# Patient Record
Sex: MALE | Race: WHITE | NOT HISPANIC OR LATINO | URBAN - NONMETROPOLITAN AREA
[De-identification: names, ages, dates, MRNs, and addresses within clinical notes are randomized per-mention and may not be internally consistent; named-entity substitution may affect disease eponyms.]

---

## 2017-07-20 PROBLEM — D36.9 TUBULAR ADENOMA: Status: ACTIVE | Noted: 2017-07-20

## 2021-04-12 ENCOUNTER — TRANSCRIBE ORDERS (OUTPATIENT)
Dept: ADMINISTRATIVE | Facility: HOSPITAL | Age: 66
End: 2021-04-12

## 2021-04-12 DIAGNOSIS — H91.20 SUDDEN HEARING LOSS, UNSPECIFIED LATERALITY: Primary | ICD-10-CM

## 2021-04-16 ENCOUNTER — HOSPITAL ENCOUNTER (OUTPATIENT)
Dept: MRI IMAGING | Facility: HOSPITAL | Age: 66
Discharge: HOME | End: 2021-04-16

## 2021-04-16 DIAGNOSIS — H91.20 SUDDEN HEARING LOSS, UNSPECIFIED LATERALITY: ICD-10-CM

## 2021-04-16 LAB — CREAT BLDA-MCNC: 1 MG/DL (ref 0.6–1.3)

## 2021-04-16 PROCEDURE — 70553 MRI BRAIN STEM W/O & W/DYE: CPT | Performed by: RADIOLOGY

## 2021-04-16 PROCEDURE — 0 GADOBENATE DIMEGLUMINE 529 MG/ML SOLUTION

## 2021-04-16 PROCEDURE — 70553 MRI BRAIN STEM W/O & W/DYE: CPT

## 2021-04-16 PROCEDURE — 70543 MRI ORBT/FAC/NCK W/O &W/DYE: CPT | Performed by: RADIOLOGY

## 2021-04-16 PROCEDURE — A9577 INJ MULTIHANCE: HCPCS

## 2021-04-16 PROCEDURE — A9577 INJ MULTIHANCE: HCPCS | Performed by: OTOLARYNGOLOGY

## 2021-04-16 PROCEDURE — 0 GADOBENATE DIMEGLUMINE 529 MG/ML SOLUTION: Performed by: OTOLARYNGOLOGY

## 2021-04-16 PROCEDURE — 82565 ASSAY OF CREATININE: CPT

## 2021-04-16 RX ADMIN — GADOBENATE DIMEGLUMINE 20 ML: 529 INJECTION, SOLUTION INTRAVENOUS at 11:55

## 2021-10-22 ENCOUNTER — IMMUNIZATION (OUTPATIENT)
Dept: VACCINE CLINIC | Facility: HOSPITAL | Age: 66
End: 2021-10-22

## 2021-10-22 PROCEDURE — 91300 HC SARSCOV02 VAC 30MCG/0.3ML IM: CPT | Performed by: INTERNAL MEDICINE

## 2021-10-22 PROCEDURE — 0004A ADM SARSCOV2 30MCG/0.3ML BOOSTER: CPT | Performed by: INTERNAL MEDICINE

## 2024-10-09 ENCOUNTER — OFFICE VISIT (OUTPATIENT)
Dept: FAMILY MEDICINE CLINIC | Facility: CLINIC | Age: 69
End: 2024-10-09
Payer: MEDICARE

## 2024-10-09 ENCOUNTER — TELEPHONE (OUTPATIENT)
Dept: FAMILY MEDICINE CLINIC | Facility: CLINIC | Age: 69
End: 2024-10-09

## 2024-10-09 ENCOUNTER — PATIENT ROUNDING (BHMG ONLY) (OUTPATIENT)
Dept: FAMILY MEDICINE CLINIC | Facility: CLINIC | Age: 69
End: 2024-10-09
Payer: MEDICARE

## 2024-10-09 VITALS
HEART RATE: 85 BPM | WEIGHT: 256.4 LBS | OXYGEN SATURATION: 97 % | TEMPERATURE: 96.8 F | SYSTOLIC BLOOD PRESSURE: 122 MMHG | BODY MASS INDEX: 36.71 KG/M2 | DIASTOLIC BLOOD PRESSURE: 76 MMHG | HEIGHT: 70 IN

## 2024-10-09 DIAGNOSIS — E11.65 TYPE 2 DIABETES MELLITUS WITH HYPERGLYCEMIA, WITHOUT LONG-TERM CURRENT USE OF INSULIN: ICD-10-CM

## 2024-10-09 PROCEDURE — 99204 OFFICE O/P NEW MOD 45 MIN: CPT | Performed by: STUDENT IN AN ORGANIZED HEALTH CARE EDUCATION/TRAINING PROGRAM

## 2024-10-09 PROCEDURE — 1126F AMNT PAIN NOTED NONE PRSNT: CPT | Performed by: STUDENT IN AN ORGANIZED HEALTH CARE EDUCATION/TRAINING PROGRAM

## 2024-10-09 RX ORDER — BLOOD-GLUCOSE METER
1 KIT MISCELLANEOUS AS NEEDED
Qty: 1 EACH | Refills: 0 | Status: SHIPPED | OUTPATIENT
Start: 2024-10-09

## 2024-10-09 RX ORDER — METFORMIN HCL 500 MG
500 TABLET, EXTENDED RELEASE 24 HR ORAL 2 TIMES DAILY
Qty: 60 TABLET | Refills: 2 | Status: SHIPPED | OUTPATIENT
Start: 2024-10-09

## 2024-10-09 RX ORDER — LANCETS 28 GAUGE
EACH MISCELLANEOUS
Qty: 100 EACH | Refills: 12 | Status: SHIPPED | OUTPATIENT
Start: 2024-10-09

## 2024-10-09 NOTE — PROGRESS NOTES
October 9, 2024    Hello, may I speak with Arsalan Garcia?    My name is   MARY JO    I am  with [facility]  [facility]  [address] [place] [address] [phone].    Before we get started may I verify your date of birth? 1955 YES    I am calling to officially welcome you to our practice and ask about your recent visit. Is this a good time to talk? YES    Tell me about your visit with us. What things went well?  THE VISIT WENT GREAT         We're always looking for ways to make our patients' experiences even better. Do you have recommendations on ways we may improve?  NO     Overall were you satisfied with your first visit to our practice?   YES     I appreciate you taking the time to speak with me today. Is there anything else I can do for you?   NO     Thank you, and have a great day.

## 2024-10-09 NOTE — PROGRESS NOTES
Chief Complaint  Hyperglycemia    HPI:   Hyperglycemia       Arsalan Garcia is a 69 y.o. male who presents today to [facility] for Hyperglycemia. Patient presents for new onset type II diabetes. He reports that his A1c was checked at orthopedic surgery when he was preparing to have his right hip replaced. His A1c at that time was 10.1. He reports that he has polydipsia, and polyuria. He denies weight loss.     Previous History:   Past Medical History:   Diagnosis Date    Sleep apnea       Past Surgical History:   Procedure Laterality Date    TOTAL HIP ARTHROPLASTY Left           Social History     Socioeconomic History    Marital status: Unknown   Tobacco Use    Smoking status: Former     Current packs/day: 0.00     Types: Cigarettes     Quit date:      Years since quittin.8    Smokeless tobacco: Never   Vaping Use    Vaping status: Never Used   Substance and Sexual Activity    Alcohol use: Never    Drug use: Defer    Sexual activity: Never      Health Maintenance Due   Topic Date Due    BMI FOLLOWUP  Never done    TDAP/TD VACCINES (1 - Tdap) Never done    ZOSTER VACCINE (1 of 2) Never done    HEPATITIS C SCREENING  Never done    ANNUAL WELLNESS VISIT  Never done    COVID-19 Vaccine (4 - 2023-24 season) 2024        Current Medications:  Current Outpatient Medications   Medication Sig Dispense Refill    glucose blood test strip Use as instructed 100 each 12    glucose monitor monitoring kit Use 1 each As Needed (Check Glucose TID). 1 each 0    Lancets (freestyle) lancets Check Glucose  each 12    metFORMIN ER (GLUCOPHAGE-XR) 500 MG 24 hr tablet Take 1 tablet by mouth 2 (Two) Times a Day. 60 tablet 2    Semaglutide,0.25 or 0.5MG/DOS, (OZEMPIC) 2 MG/1.5ML solution pen-injector Inject 0.25 mg under the skin into the appropriate area as directed 1 (One) Time Per Week. 1.5 mL 0     No current facility-administered medications for this visit.       Allergies:  "  Allergies   Allergen Reactions    Shrimp Nausea And Vomiting       Vitals:   /76 (BP Location: Right arm, Patient Position: Sitting, Cuff Size: Adult)   Pulse 85   Temp 96.8 °F (36 °C) (Temporal)   Ht 177.8 cm (70\")   Wt 116 kg (256 lb 6.4 oz)   SpO2 97%   BMI 36.79 kg/m²     Estimated body mass index is 36.79 kg/m² as calculated from the following:    Height as of this encounter: 177.8 cm (70\").    Weight as of this encounter: 116 kg (256 lb 6.4 oz).    Arsalan Garcia  reports that he quit smoking about 49 years ago. His smoking use included cigarettes. He has never used smokeless tobacco.            Physical Exam:   Physical Exam  Constitutional:       Appearance: Normal appearance.   HENT:      Mouth/Throat:      Mouth: Mucous membranes are moist.   Cardiovascular:      Rate and Rhythm: Normal rate and regular rhythm.   Pulmonary:      Effort: Pulmonary effort is normal.      Breath sounds: Normal breath sounds. No wheezing or rhonchi.   Musculoskeletal:         General: Normal range of motion.   Skin:     General: Skin is warm and dry.   Neurological:      Mental Status: He is alert.   Psychiatric:         Mood and Affect: Mood normal.          Lab Results:   No visits with results within 6 Month(s) from this visit.   Latest known visit with results is:   Hospital Outpatient Visit on 04/16/2021   Component Date Value Ref Range Status    Creatinine 04/16/2021 1.00  0.60 - 1.30 mg/dL Final    Serial Number: [id number]:  [id number]       Assessment and Plan  Diagnoses and all orders for this visit:    1. Type 2 diabetes mellitus with hyperglycemia, without long-term current use of insulin  -     metFORMIN ER (GLUCOPHAGE-XR) 500 MG 24 hr tablet; Take 1 tablet by mouth 2 (Two) Times a Day.  Dispense: 60 tablet; Refill: 2  -     Semaglutide,0.25 or 0.5MG/DOS, (OZEMPIC) 2 MG/1.5ML solution pen-injector; Inject 0.25 mg under the skin into the appropriate area as directed 1 (One) Time Per Week.  Dispense: 1.5 " mL; Refill: 0  -     glucose blood test strip; Use as instructed  Dispense: 100 each; Refill: 12  -     Lancets (freestyle) lancets; Check Glucose TID  Dispense: 100 each; Refill: 12  -     glucose monitor monitoring kit; Use 1 each As Needed (Check Glucose TID).  Dispense: 1 each; Refill: 0    Patient has new onset diabetes with A1c of 10.7. Patient currently has hyperglycemic symptoms. Due to his high glucose he will likely need two anti-diabetic drugs. I will start metformin 500mg to increase insulin sensitivity as well as ozempic 0.25mg weekly. Patient denies history of pancreatitis, no history of gallstones, no history of medullary thyroid cancer. Discussed risks and benefits of GLP-1s. I will discuss ace/arb at next visit, as well as statin for ASCVD prevention. Discussed dietary measures.     I spent 32 minutes of face to face time with the patient, explaining the diagnosis and treatment plan.       Class 2 Severe Obesity (BMI >=35 and <=39.9). Obesity-related health conditions include the following: obstructive sleep apnea and diabetes mellitus. Obesity is newly identified. BMI is is above average; BMI management plan is completed. We discussed portion control and increasing exercise.       New Medications:   New Medications Ordered This Visit   Medications    metFORMIN ER (GLUCOPHAGE-XR) 500 MG 24 hr tablet     Sig: Take 1 tablet by mouth 2 (Two) Times a Day.     Dispense:  60 tablet     Refill:  2    Semaglutide,0.25 or 0.5MG/DOS, (OZEMPIC) 2 MG/1.5ML solution pen-injector     Sig: Inject 0.25 mg under the skin into the appropriate area as directed 1 (One) Time Per Week.     Dispense:  1.5 mL     Refill:  0    glucose blood test strip     Sig: Use as instructed     Dispense:  100 each     Refill:  12    Lancets (freestyle) lancets     Sig: Check Glucose TID     Dispense:  100 each     Refill:  12    glucose monitor monitoring kit     Sig: Use 1 each As Needed (Check Glucose TID).     Dispense:  1 each      Refill:  0       Discontinued Medications:   There are no discontinued medications.              Follow Up:   No follow-ups on file.    Patient was given instructions and counseling regarding his condition or for health maintenance advice. Please see specific information pulled into the AVS if appropriate.       This document has been electronically signed by Hari Diaz DO   October 9, 2024 14:04 EDT    Dictated Utilizing Dragon Dictation: Part of this note may be an electronic transcription/translation of spoken language to printed text using the Dragon Dictation System.

## 2024-10-09 NOTE — TELEPHONE ENCOUNTER
Pt's wife called and stated that when going to  the Ozempic injection, the pharmacist told them that he thinks that 0.25 mg is not high enough for pt since he has a high A1C. I explained to her that this is his first time use, and this is the typical dosing for first time use of this medication. She would still like you to clarify if he is on the correct dose or not.

## 2024-11-07 DIAGNOSIS — E11.65 TYPE 2 DIABETES MELLITUS WITH HYPERGLYCEMIA, WITHOUT LONG-TERM CURRENT USE OF INSULIN: ICD-10-CM

## 2024-11-08 RX ORDER — SEMAGLUTIDE 0.68 MG/ML
INJECTION, SOLUTION SUBCUTANEOUS
Qty: 3 ML | Refills: 0 | Status: SHIPPED | OUTPATIENT
Start: 2024-11-08

## 2024-11-18 ENCOUNTER — OFFICE VISIT (OUTPATIENT)
Dept: UROLOGY | Facility: CLINIC | Age: 69
End: 2024-11-18
Payer: MEDICARE

## 2024-11-18 VITALS
WEIGHT: 248 LBS | HEART RATE: 61 BPM | SYSTOLIC BLOOD PRESSURE: 133 MMHG | HEIGHT: 70 IN | DIASTOLIC BLOOD PRESSURE: 82 MMHG | BODY MASS INDEX: 35.5 KG/M2

## 2024-11-18 DIAGNOSIS — R39.12 BENIGN PROSTATIC HYPERPLASIA WITH WEAK URINARY STREAM: Primary | ICD-10-CM

## 2024-11-18 DIAGNOSIS — N40.1 BENIGN PROSTATIC HYPERPLASIA WITH WEAK URINARY STREAM: Primary | ICD-10-CM

## 2024-11-18 DIAGNOSIS — N52.01 ERECTILE DYSFUNCTION DUE TO ARTERIAL INSUFFICIENCY: ICD-10-CM

## 2024-11-18 PROCEDURE — 1160F RVW MEDS BY RX/DR IN RCRD: CPT

## 2024-11-18 PROCEDURE — 36415 COLL VENOUS BLD VENIPUNCTURE: CPT

## 2024-11-18 PROCEDURE — 99203 OFFICE O/P NEW LOW 30 MIN: CPT

## 2024-11-18 PROCEDURE — 1159F MED LIST DOCD IN RCRD: CPT

## 2024-11-18 RX ORDER — TADALAFIL 5 MG/1
5 TABLET ORAL DAILY
Qty: 30 TABLET | Refills: 2 | Status: SHIPPED | OUTPATIENT
Start: 2024-11-18

## 2024-11-18 RX ORDER — TADALAFIL 10 MG/1
10 TABLET ORAL DAILY PRN
Qty: 20 TABLET | Refills: 1 | Status: SHIPPED | OUTPATIENT
Start: 2024-11-18

## 2024-11-18 NOTE — PROGRESS NOTES
"Chief Complaint:    Chief Complaint   Patient presents with    Erectile Dysfunction       Vital Signs:   /82 (BP Location: Left arm, Patient Position: Sitting)   Pulse 61   Ht 177.8 cm (70\")   Wt 112 kg (248 lb)   BMI 35.58 kg/m²   Body mass index is 35.58 kg/m².      HPI:  Arsalan Garcia is a 69 y.o. male who presents today for initial evaluation     History of Present Illness  Mr. Garcia presents to the clinic for evaluation of erectile dysfunction and lower urinary tract symptoms.  He is being referred to us by Dr. Hari Blue DO.  He has a past medical history significant for sleep apnea and type 2 diabetes mellitus.  Patient reports that 6 years ago he was started on a blood pressure medication and has had erectile issues since.  He has not seen anyone about this issue previously and has never taken any on-demand medications.  He recently was diagnosed with an elevated A1c in October prior to possible hip surgery.  His A1c was elevated at greater than 10 at that time and his surgery was canceled.  He did undergo a previous hip arthroplasty in May of this year and his A1c was normal within a 5-6 range.  He reports in September 2023 his A1c was normal at that time 2.  He also endorses having a normal PSA in September 2023 at less than 1.  He does endorse a family history of prostate cancer in his father.  He denies any history of elevated PSAs.  States he has decreased morning erections as well as difficulty maintaining and obtaining.  He has also had issues with weak stream since beginning metformin.  He reports intermittency at times but denies any excessive urgency or nocturia.      Past Medical History:  Past Medical History:   Diagnosis Date    Diabetes mellitus     Sleep apnea        Current Meds:  Current Outpatient Medications   Medication Sig Dispense Refill    glucose blood test strip Use as instructed 100 each 12    glucose monitor monitoring kit Use 1 each As Needed (Check Glucose TID). 1 " each 0    Lancets (freestyle) lancets Check Glucose  each 12    metFORMIN ER (GLUCOPHAGE-XR) 500 MG 24 hr tablet Take 1 tablet by mouth 2 (Two) Times a Day. 60 tablet 2    Ozempic, 0.25 or 0.5 MG/DOSE, 2 MG/3ML solution pen-injector INJECT 0.25MG UNDER THE SKIN ONE DAY EACH WEEK FOR 4 WEEKS, THEN INCREASE TO 0.5MG WEEKLY 3 mL 0    tadalafil (Cialis) 10 MG tablet Take 1 tablet by mouth Daily As Needed for Erectile Dysfunction. 20 tablet 1    tadalafil (CIALIS) 5 MG tablet Take 1 tablet by mouth Daily. 30 tablet 2     No current facility-administered medications for this visit.        Allergies:   Allergies   Allergen Reactions    Shrimp Nausea And Vomiting        Past Surgical History:  Past Surgical History:   Procedure Laterality Date    TOTAL HIP ARTHROPLASTY Left            Social History:  Social History     Socioeconomic History    Marital status:    Tobacco Use    Smoking status: Former     Current packs/day: 0.00     Types: Cigarettes     Quit date:      Years since quittin.9    Smokeless tobacco: Never   Vaping Use    Vaping status: Never Used   Substance and Sexual Activity    Alcohol use: Never    Drug use: Defer    Sexual activity: Never       Family History:  Family History   Problem Relation Age of Onset    Prostate cancer Father     Colon cancer Mother        Review of Systems:  Review of Systems   Constitutional:  Negative for chills, fatigue and fever.   Respiratory:  Negative for cough, shortness of breath and wheezing.    Cardiovascular:  Negative for leg swelling.   Gastrointestinal:  Negative for abdominal pain, nausea and vomiting.   Genitourinary:  Positive for difficulty urinating and frequency. Negative for dysuria, hematuria, penile pain, penile swelling, scrotal swelling, testicular pain and urgency.        Erectile dysfunction   Musculoskeletal:  Negative for back pain and joint swelling.   Neurological:  Negative for dizziness and headaches.    Psychiatric/Behavioral:  Negative for confusion.        Physical Exam:  Physical Exam  Constitutional:       General: He is not in acute distress.     Appearance: Normal appearance.   HENT:      Head: Normocephalic and atraumatic.      Nose: Nose normal.      Mouth/Throat:      Mouth: Mucous membranes are moist.   Eyes:      Conjunctiva/sclera: Conjunctivae normal.   Cardiovascular:      Rate and Rhythm: Normal rate and regular rhythm.      Pulses: Normal pulses.      Heart sounds: Normal heart sounds.   Pulmonary:      Effort: Pulmonary effort is normal.      Breath sounds: Normal breath sounds.   Abdominal:      General: Bowel sounds are normal.      Palpations: Abdomen is soft.   Musculoskeletal:         General: Normal range of motion.      Cervical back: Normal range of motion.   Skin:     General: Skin is warm.   Neurological:      General: No focal deficit present.      Mental Status: He is alert and oriented to person, place, and time.   Psychiatric:         Mood and Affect: Mood normal.         Behavior: Behavior normal.         Thought Content: Thought content normal.         Judgment: Judgment normal.             Recent Image (CT and/or KUB):   CT Abdomen and Pelvis: No results found for this or any previous visit.     CT Stone Protocol: No results found for this or any previous visit.     KUB: No results found for this or any previous visit.       Labs:  Brief Urine Lab Results       None          No visits with results within 3 Month(s) from this visit.   Latest known visit with results is:   Hospital Outpatient Visit on 04/16/2021   Component Date Value Ref Range Status    Creatinine 04/16/2021 1.00  0.60 - 1.30 mg/dL Final    Serial Number: [id number]:  [id number]        Procedure: None  Procedures     I have reviewed and agree with the above PMH, PSH, FMH, social history, medications, allergies, and labs.     Assessment/Plan:   Problem List Items Addressed This Visit       Benign prostatic  hyperplasia with weak urinary stream - Primary    Relevant Medications    tadalafil (CIALIS) 5 MG tablet    tadalafil (Cialis) 10 MG tablet    Other Relevant Orders    PSA Diagnostic    Erectile dysfunction due to arterial insufficiency    Relevant Medications    tadalafil (Cialis) 10 MG tablet       Health Maintenance:   Health Maintenance Due   Topic Date Due    DIABETIC FOOT EXAM  Never done    DIABETIC EYE EXAM  Never done    URINE MICROALBUMIN  Never done    TDAP/TD VACCINES (1 - Tdap) Never done    ZOSTER VACCINE (1 of 2) Never done    HEPATITIS C SCREENING  Never done    ANNUAL WELLNESS VISIT  Never done    HEMOGLOBIN A1C  Never done    COVID-19 Vaccine (4 - 2024-25 season) 09/01/2024        Smoking Counseling: Former smoker.  Never used smokeless tobacco.    Urine Incontinence: Patient reports that he is not currently experiencing any symptoms of urinary incontinence.    Patient was given instructions and counseling regarding his condition or for health maintenance advice. Please see specific information pulled into the AVS if appropriate.    Patient Education:   Benign Prostate Hypertrophy (BPH): Discussed the pathophysiology of BPH and obstruction.  We discussed the static and dynamic effects of BPH as well as using 5 alpha reductase inhibitors versus alpha blockade.  We discussed the indications for transurethral surgery as well and/ or other therapeutic options available including all of the newer techniques.  Given patient's concurrent erectile dysfunction as well as LUTS we will start him on tadalafil 5 mg once daily.  Discussed the risks of PDE 5 inhibitors in detail with the patient.  Discussed side effects which can include lightheadedness, dizziness, blurry vision, chest pain, shortness of breath, syncope, and priapism.  Vies priapism is a medical emergency requires immediate attention.  Educated him to return to the clinic or to the nearest ER if he has an erection lasting longer than 2 hours when  it becomes painful/discolored.  He verbalized understanding  PSA testing - I am recommending a prostate specific antigen blood test or PSA.  I discussed the pathophysiology of PSA testing indicating its use in the diagnosis and management of prostate cancer.  I discussed the normal range being 0 to 4, but more appropriately being much closer to 0 to 2 in a normal male.  I discussed the fact that after a certain age it is against recommendation to use PSA testing especially in view of numerous comorbidities.  I discussed many of the things that can artificially raise PSA including put not limited to a recent infection, urinary tract infection, recent sexual intercourse, or even the type of movement such as manipulation of the prostate from riding a bicycle.  It was discussed that the most important use of PSA is the velocity measurement.  This refers to the change of PSA with time. I discussed that we look for greater than 20% rise over a year to help us make the prediction of prostate cancer.  I also discussed that in the case of prostate cancer indicating a radical prostatectomy, the PSA should be 0 and any rise indicates an early biochemical recurrence.  I will call him with PSA results once available  Erectile dysfunction -discussed with the patient the pathophysiology of erectile dysfunction.  I explained to the patient that erectile dysfunction is a symptom and no disorder.  I educated the patient that erectile dysfunction is often secondary to significant arterial insufficiency, diabetes mellitus, medications, trauma, low testosterone, or psychological factors.  I discussed with the patient ways to help treat erectile dysfunction which include but are not limited to medications, mechanical devices, penile injections, and penile implants.  Given patient has not tried any medications in the past we will start him on tadalafil 5 mg daily and tadalafil 10 mg on demand up to 2 pills within a 24-hour period.  Risk  and benefits were discussed above in BPH education.  I we will follow-up with the patient in 3 months or sooner if needed    Visit Diagnoses:    ICD-10-CM ICD-9-CM   1. Benign prostatic hyperplasia with weak urinary stream  N40.1 600.01    R39.12 788.62   2. Erectile dysfunction due to arterial insufficiency  N52.01 607.84     A total of 30 minutes were spent coordinating this patient’s care in clinic today; 15 minutes of which were face-to-face with the patient, reviewing medical history and counseling on the current treatment and followup plan.  All questions were answered to patient's satisfaction.    Meds Ordered During Visit:  New Medications Ordered This Visit   Medications    tadalafil (CIALIS) 5 MG tablet     Sig: Take 1 tablet by mouth Daily.     Dispense:  30 tablet     Refill:  2    tadalafil (Cialis) 10 MG tablet     Sig: Take 1 tablet by mouth Daily As Needed for Erectile Dysfunction.     Dispense:  20 tablet     Refill:  1       Follow Up Appointment: 3 months  No follow-ups on file.      This document has been electronically signed by Sha Chi PA-C   November 18, 2024 13:06 EST    Part of this note may be an electronic transcription/translation of spoken language to printed text using the Dragon Dictation System.

## 2024-11-19 ENCOUNTER — TELEPHONE (OUTPATIENT)
Dept: UROLOGY | Facility: CLINIC | Age: 69
End: 2024-11-19
Payer: MEDICARE

## 2024-11-19 LAB — PSA SERPL-MCNC: 0.41 NG/ML (ref 0–4)

## 2024-11-19 NOTE — TELEPHONE ENCOUNTER
Call patient advise him PSA was great at 0.4.  Recommend to keep follow-up and call back with any questions or concerns.  He verbalized understanding.

## 2024-11-20 ENCOUNTER — PATIENT ROUNDING (BHMG ONLY) (OUTPATIENT)
Dept: UROLOGY | Facility: CLINIC | Age: 69
End: 2024-11-20
Payer: MEDICARE

## 2024-11-25 ENCOUNTER — TELEPHONE (OUTPATIENT)
Dept: UROLOGY | Facility: CLINIC | Age: 69
End: 2024-11-25
Payer: MEDICARE

## 2024-11-25 NOTE — TELEPHONE ENCOUNTER
Caller: NURIA SANCHEZ    Relationship: SELF     Best call back number:     [phone]         What is the best time to reach you: ANY    Who are you requesting to speak with (clinical staff, provider,  specific staff member): CASSIE    Do you know the name of the person who called: PATIENT    What was the call regarding: PT WANTS A CALL BACK FROM CASSIE REGARDING A PRESCRIPTION. DID NOT SPECIFY WHAT MEDICATION, JUST ADVISED HE WANTS A CALL BACK. PLEASE CALL BACK TO ADVISE. THANK YOU.

## 2024-12-09 ENCOUNTER — LAB (OUTPATIENT)
Dept: FAMILY MEDICINE CLINIC | Facility: CLINIC | Age: 69
End: 2024-12-09
Payer: MEDICARE

## 2024-12-09 ENCOUNTER — OFFICE VISIT (OUTPATIENT)
Dept: FAMILY MEDICINE CLINIC | Facility: CLINIC | Age: 69
End: 2024-12-09
Payer: MEDICARE

## 2024-12-09 VITALS
BODY MASS INDEX: 34.93 KG/M2 | WEIGHT: 244 LBS | OXYGEN SATURATION: 95 % | TEMPERATURE: 97.3 F | DIASTOLIC BLOOD PRESSURE: 68 MMHG | HEIGHT: 70 IN | HEART RATE: 85 BPM | SYSTOLIC BLOOD PRESSURE: 124 MMHG

## 2024-12-09 DIAGNOSIS — G47.33 OSA (OBSTRUCTIVE SLEEP APNEA): ICD-10-CM

## 2024-12-09 DIAGNOSIS — E11.65 TYPE 2 DIABETES MELLITUS WITH HYPERGLYCEMIA, WITHOUT LONG-TERM CURRENT USE OF INSULIN: Primary | ICD-10-CM

## 2024-12-09 DIAGNOSIS — E78.2 MIXED HYPERLIPIDEMIA: ICD-10-CM

## 2024-12-09 DIAGNOSIS — R11.0 NAUSEA: ICD-10-CM

## 2024-12-09 LAB
EXPIRATION DATE: ABNORMAL
HBA1C MFR BLD: 5.9 % (ref 4.5–5.7)
Lab: ABNORMAL

## 2024-12-09 PROCEDURE — 3044F HG A1C LEVEL LT 7.0%: CPT | Performed by: STUDENT IN AN ORGANIZED HEALTH CARE EDUCATION/TRAINING PROGRAM

## 2024-12-09 PROCEDURE — 99214 OFFICE O/P EST MOD 30 MIN: CPT | Performed by: STUDENT IN AN ORGANIZED HEALTH CARE EDUCATION/TRAINING PROGRAM

## 2024-12-09 PROCEDURE — 1126F AMNT PAIN NOTED NONE PRSNT: CPT | Performed by: STUDENT IN AN ORGANIZED HEALTH CARE EDUCATION/TRAINING PROGRAM

## 2024-12-09 PROCEDURE — 1170F FXNL STATUS ASSESSED: CPT | Performed by: STUDENT IN AN ORGANIZED HEALTH CARE EDUCATION/TRAINING PROGRAM

## 2024-12-09 PROCEDURE — 96160 PT-FOCUSED HLTH RISK ASSMT: CPT | Performed by: STUDENT IN AN ORGANIZED HEALTH CARE EDUCATION/TRAINING PROGRAM

## 2024-12-09 PROCEDURE — 83036 HEMOGLOBIN GLYCOSYLATED A1C: CPT | Performed by: STUDENT IN AN ORGANIZED HEALTH CARE EDUCATION/TRAINING PROGRAM

## 2024-12-09 PROCEDURE — G0439 PPPS, SUBSEQ VISIT: HCPCS | Performed by: STUDENT IN AN ORGANIZED HEALTH CARE EDUCATION/TRAINING PROGRAM

## 2024-12-09 RX ORDER — ATORVASTATIN CALCIUM 10 MG/1
10 TABLET, FILM COATED ORAL DAILY
Qty: 90 TABLET | Refills: 1 | Status: SHIPPED | OUTPATIENT
Start: 2024-12-09

## 2024-12-09 RX ORDER — ONDANSETRON 4 MG/1
4 TABLET, ORALLY DISINTEGRATING ORAL EVERY 8 HOURS PRN
Qty: 30 TABLET | Refills: 5 | Status: SHIPPED | OUTPATIENT
Start: 2024-12-09

## 2024-12-09 RX ORDER — SEMAGLUTIDE 0.68 MG/ML
0.5 INJECTION, SOLUTION SUBCUTANEOUS WEEKLY
Qty: 9 ML | Refills: 1 | Status: SHIPPED | OUTPATIENT
Start: 2024-12-09

## 2024-12-09 NOTE — PROGRESS NOTES
Subjective   The ABCs of the Annual Wellness Visit  Medicare Wellness Visit      Arsalan Garcia is a 69 y.o. patient who presents for a Medicare Wellness Visit.    The following portions of the patient's history were reviewed and   updated as appropriate: allergies, current medications, past family history, past medical history, past social history, past surgical history, and problem list.    Compared to one year ago, the patient's physical   health is better.  Compared to one year ago, the patient's mental   health is the same.    Recent Hospitalizations:  He was not admitted to the hospital during the last year.     Current Medical Providers:  Patient Care Team:  Hari Diaz DO as PCP - General (Family Medicine)    Outpatient Medications Prior to Visit   Medication Sig Dispense Refill    glucose blood test strip Use as instructed 100 each 12    glucose monitor monitoring kit Use 1 each As Needed (Check Glucose TID). 1 each 0    Lancets (freestyle) lancets Check Glucose  each 12    metFORMIN ER (GLUCOPHAGE-XR) 500 MG 24 hr tablet Take 1 tablet by mouth 2 (Two) Times a Day. 60 tablet 2    Ozempic, 0.25 or 0.5 MG/DOSE, 2 MG/3ML solution pen-injector INJECT 0.25MG UNDER THE SKIN ONE DAY EACH WEEK FOR 4 WEEKS, THEN INCREASE TO 0.5MG WEEKLY 3 mL 0    tadalafil (Cialis) 10 MG tablet Take 1 tablet by mouth Daily As Needed for Erectile Dysfunction. 20 tablet 1    tadalafil (CIALIS) 5 MG tablet Take 1 tablet by mouth Daily. 30 tablet 2     No facility-administered medications prior to visit.     No opioid medication identified on active medication list. I have reviewed chart for other potential  high risk medication/s and harmful drug interactions in the elderly.      Aspirin is not on active medication list.  Aspirin use is not indicated based on review of current medical condition/s. Risk of harm outweighs potential benefits.  .    Patient Active Problem List   Diagnosis    Tubular adenoma    Benign prostatic  "hyperplasia with weak urinary stream    Erectile dysfunction due to arterial insufficiency     Advance Care Planning Advance Directive is not on file.  ACP discussion was held with the patient during this visit. Patient does not have an advance directive, information provided.            Objective   Vitals:    24 1358   BP: 124/68   BP Location: Right arm   Patient Position: Sitting   Cuff Size: Adult   Pulse: 85   Temp: 97.3 °F (36.3 °C)   TempSrc: Temporal   SpO2: 95%   Weight: 111 kg (244 lb)   Height: 177.8 cm (70\")   PainSc: 0-No pain       Estimated body mass index is 35.01 kg/m² as calculated from the following:    Height as of this encounter: 177.8 cm (70\").    Weight as of this encounter: 111 kg (244 lb).            Does the patient have evidence of cognitive impairment? No  Lab Results   Component Value Date    HGBA1C 5.9 (A) 2024                                                                                                Health  Risk Assessment    Smoking Status:  Social History     Tobacco Use   Smoking Status Former    Current packs/day: 0.00    Types: Cigarettes    Quit date:     Years since quittin.9   Smokeless Tobacco Never     Alcohol Consumption:  Social History     Substance and Sexual Activity   Alcohol Use Never       Fall Risk Screen  STEADI Fall Risk Assessment was completed, and patient is at LOW risk for falls.Assessment completed on:2024    Depression Screening   Little interest or pleasure in doing things? Not at all   Feeling down, depressed, or hopeless? Not at all   PHQ-2 Total Score 0      Health Habits and Functional and Cognitive Screenin/9/2024     1:59 PM   Functional & Cognitive Status   Do you have difficulty preparing food and eating? No   Do you have difficulty bathing yourself, getting dressed or grooming yourself? No   Do you have difficulty using the toilet? No   Do you have difficulty moving around from place to place? No   Do you have " trouble with steps or getting out of a bed or a chair? No   Current Diet Well Balanced Diet   Dental Exam Up to date   Eye Exam Up to date   Exercise (times per week) 2 times per week   Current Exercises Include Walking   Do you need help using the phone?  No   Are you deaf or do you have serious difficulty hearing?  No   Do you need help to go to places out of walking distance? No   Do you need help shopping? No   Do you need help preparing meals?  No   Do you need help with housework?  No   Do you need help with laundry? No   Do you need help taking your medications? No   Do you need help managing money? No   Do you ever drive or ride in a car without wearing a seat belt? No   Have you felt unusual stress, anger or loneliness in the last month? No   Who do you live with? Spouse   If you need help, do you have trouble finding someone available to you? No   Have you been bothered in the last four weeks by sexual problems? No   Do you have difficulty concentrating, remembering or making decisions? No           Age-appropriate Screening Schedule:  Refer to the list below for future screening recommendations based on patient's age, sex and/or medical conditions. Orders for these recommended tests are listed in the plan section. The patient has been provided with a written plan.    Health Maintenance List  Health Maintenance   Topic Date Due    DIABETIC FOOT EXAM  Never done    URINE MICROALBUMIN  Never done    TDAP/TD VACCINES (1 - Tdap) Never done    ZOSTER VACCINE (1 of 2) Never done    HEPATITIS C SCREENING  Never done    ANNUAL WELLNESS VISIT  Never done    COVID-19 Vaccine (4 - 2024-25 season) 09/01/2024    INFLUENZA VACCINE  03/31/2025 (Originally 7/1/2024)    Pneumococcal Vaccine 65+ (1 of 2 - PCV) 10/09/2025 (Originally 6/9/1961)    HEMOGLOBIN A1C  06/09/2025    DIABETIC EYE EXAM  11/04/2025    BMI FOLLOWUP  11/08/2025    COLORECTAL CANCER SCREENING  07/16/2030    AAA SCREEN (ONE-TIME)  Completed                 "                                                                                                                                CMS Preventative Services Quick Reference  Risk Factors Identified During Encounter  None Identified    The above risks/problems have been discussed with the patient.  Pertinent information has been shared with the patient in the After Visit Summary.  An After Visit Summary and PPPS were made available to the patient.    Follow Up:   Next Medicare Wellness visit to be scheduled in 1 year.         Additional E&M Note during same encounter follows:  Patient has additional, significant, and separately identifiable condition(s)/problem(s) that require work above and beyond the Medicare Wellness Visit     Chief Complaint  Medicare Wellness-subsequent    Subjective   HPI  Arsalan is also being seen today for additional medical problem/s of type 2 diabetes.  Patient had new onset diabetes with a A1c of 10.6 at his last visit 2 months ago.  He has been on a strict low calorie diet with reduction of sugar intake.  He was started on metformin 500 mg twice daily as well as Ozempic 0.25 mg.  He has lost approximately 15 pounds since starting this diet.  He reports no side effects from the Ozempic.  A1c is now 5.9.                Objective   Vital Signs:  /68 (BP Location: Right arm, Patient Position: Sitting, Cuff Size: Adult)   Pulse 85   Temp 97.3 °F (36.3 °C) (Temporal)   Ht 177.8 cm (70\")   Wt 111 kg (244 lb)   SpO2 95%   BMI 35.01 kg/m²   Physical Exam  Constitutional:       Appearance: Normal appearance.   HENT:      Mouth/Throat:      Mouth: Mucous membranes are moist.   Cardiovascular:      Rate and Rhythm: Normal rate and regular rhythm.   Pulmonary:      Effort: Pulmonary effort is normal.      Breath sounds: Normal breath sounds. No wheezing or rhonchi.   Musculoskeletal:         General: Normal range of motion.   Skin:     General: Skin is warm and dry.   Neurological:      Mental " Status: He is alert.   Psychiatric:         Mood and Affect: Mood normal.                       Assessment and Plan            Type 2 diabetes mellitus with hyperglycemia, without long-term current use of insulin      Orders:    POC Glycosylated Hemoglobin (Hb A1C)    Semaglutide,0.25 or 0.5MG/DOS, (Ozempic, 0.25 or 0.5 MG/DOSE,) 2 MG/3ML solution pen-injector; Inject 0.5 mg under the skin into the appropriate area as directed 1 (One) Time Per Week.    CBC No Differential; Future    Comprehensive metabolic panel; Future    Microalbumin / Creatinine Urine Ratio - Urine, Clean Catch; Future      Type 2 diabetes now in remission with Ozempic and metformin.  Will check microalbumin creatinine ratio, CBC and CMP.  Continue Ozempic 0.5 mg and metformin 500 mg twice daily.  Recommend statin for ASCVD prevention.  On next visit we will consider ACE or ARB inhibitor for nephro protection.  Mixed hyperlipidemia       Orders:    atorvastatin (LIPITOR) 10 MG tablet; Take 1 tablet by mouth Daily.    Lipid panel; Future  2.  As above increased ASCVD risk due to type 2 diabetes, will treat with Lipitor 10 mg.  Check lipid panel.  Nausea    Orders:    ondansetron ODT (ZOFRAN-ODT) 4 MG disintegrating tablet; Place 1 tablet on the tongue Every 8 (Eight) Hours As Needed for Nausea.  3.  Patient has vasovagal episodes and becomes nauseous at times, will prescribe as needed Zofran.  Recommend trigger avoidance.  ENID (obstructive sleep apnea)  4.  Patient has obstructive sleep apnea, he wears a CPAP, he tolerates this well with noted improvement in symptoms.  Continue CPAP therapy.               Follow Up   No follow-ups on file.  Patient was given instructions and counseling regarding his condition or for health maintenance advice. Please see specific information pulled into the AVS if appropriate.

## 2024-12-10 ENCOUNTER — TELEPHONE (OUTPATIENT)
Dept: FAMILY MEDICINE CLINIC | Facility: CLINIC | Age: 69
End: 2024-12-10
Payer: MEDICARE

## 2024-12-10 LAB
ALBUMIN SERPL-MCNC: 4.1 G/DL (ref 3.5–5.2)
ALBUMIN/CREAT UR: 6 MG/G CREAT (ref 0–29)
ALBUMIN/GLOB SERPL: 1.8 G/DL
ALP SERPL-CCNC: 110 U/L (ref 39–117)
ALT SERPL-CCNC: 12 U/L (ref 1–41)
AST SERPL-CCNC: 25 U/L (ref 1–40)
BILIRUB SERPL-MCNC: 0.5 MG/DL (ref 0–1.2)
BUN SERPL-MCNC: 14 MG/DL (ref 8–23)
BUN/CREAT SERPL: 14.4 (ref 7–25)
CALCIUM SERPL-MCNC: 8.8 MG/DL (ref 8.6–10.5)
CHLORIDE SERPL-SCNC: 108 MMOL/L (ref 98–107)
CHOLEST SERPL-MCNC: 167 MG/DL (ref 0–200)
CO2 SERPL-SCNC: 22.8 MMOL/L (ref 22–29)
CREAT SERPL-MCNC: 0.97 MG/DL (ref 0.76–1.27)
CREAT UR-MCNC: 93.3 MG/DL
EGFRCR SERPLBLD CKD-EPI 2021: 84 ML/MIN/1.73
ERYTHROCYTE [DISTWIDTH] IN BLOOD BY AUTOMATED COUNT: 12.4 % (ref 12.3–15.4)
GLOBULIN SER CALC-MCNC: 2.3 GM/DL
GLUCOSE SERPL-MCNC: 98 MG/DL (ref 65–99)
HCT VFR BLD AUTO: 45.4 % (ref 37.5–51)
HDLC SERPL-MCNC: 36 MG/DL (ref 40–60)
HGB BLD-MCNC: 14.8 G/DL (ref 13–17.7)
IMP & REVIEW OF LAB RESULTS: NORMAL
LDLC SERPL CALC-MCNC: 85 MG/DL (ref 0–100)
MCH RBC QN AUTO: 30.6 PG (ref 26.6–33)
MCHC RBC AUTO-ENTMCNC: 32.6 G/DL (ref 31.5–35.7)
MCV RBC AUTO: 93.8 FL (ref 79–97)
MICROALBUMIN UR-MCNC: 5.8 UG/ML
PLATELET # BLD AUTO: 197 10*3/MM3 (ref 140–450)
POTASSIUM SERPL-SCNC: 4.4 MMOL/L (ref 3.5–5.2)
PROT SERPL-MCNC: 6.4 G/DL (ref 6–8.5)
RBC # BLD AUTO: 4.84 10*6/MM3 (ref 4.14–5.8)
SODIUM SERPL-SCNC: 142 MMOL/L (ref 136–145)
TRIGL SERPL-MCNC: 276 MG/DL (ref 0–150)
VLDLC SERPL CALC-MCNC: 46 MG/DL (ref 5–40)
WBC # BLD AUTO: 5.79 10*3/MM3 (ref 3.4–10.8)

## 2024-12-10 NOTE — TELEPHONE ENCOUNTER
Caller: Arsalan Garcia    Relationship to patient: Self    Best call back number: [phone]     PATIENT IS CALLING TO SEE IF HE COULD GET A GLUCOSE MONITOR THAT HE COULD USE WITH HIS SMART PHONE.  HIS PHARMACY IS [facility]

## 2024-12-18 ENCOUNTER — TELEPHONE (OUTPATIENT)
Dept: FAMILY MEDICINE CLINIC | Facility: CLINIC | Age: 69
End: 2024-12-18
Payer: MEDICARE

## 2024-12-18 NOTE — TELEPHONE ENCOUNTER
NURIA IS CALLING TO INFORM YOU HIS RT HIP REPLACEMENT SURGERY WAS RESCHEDULED TO [id number] [facility].

## 2024-12-26 ENCOUNTER — TELEPHONE (OUTPATIENT)
Dept: FAMILY MEDICINE CLINIC | Facility: CLINIC | Age: 69
End: 2024-12-26
Payer: MEDICARE

## 2024-12-26 NOTE — TELEPHONE ENCOUNTER
----- Message from Hari Diaz sent at 12/26/2024  9:54 AM EST -----  Please let the patient know that all of his labs are normal, no concerns.

## 2024-12-30 ENCOUNTER — TELEPHONE (OUTPATIENT)
Dept: FAMILY MEDICINE CLINIC | Facility: CLINIC | Age: 69
End: 2024-12-30
Payer: MEDICARE

## 2024-12-30 NOTE — TELEPHONE ENCOUNTER
Patient called he is at [facility] for surgery Pre-OP & needs labs from 12/09/2024 faxed to them,faxed as requested.

## 2025-01-03 DIAGNOSIS — E11.65 TYPE 2 DIABETES MELLITUS WITH HYPERGLYCEMIA, WITHOUT LONG-TERM CURRENT USE OF INSULIN: ICD-10-CM

## 2025-01-03 RX ORDER — METFORMIN HYDROCHLORIDE 500 MG/1
TABLET, EXTENDED RELEASE ORAL
Qty: 60 TABLET | Refills: 2 | Status: SHIPPED | OUTPATIENT
Start: 2025-01-03

## 2025-02-04 DIAGNOSIS — N52.01 ERECTILE DYSFUNCTION DUE TO ARTERIAL INSUFFICIENCY: ICD-10-CM

## 2025-02-04 DIAGNOSIS — R39.12 BENIGN PROSTATIC HYPERPLASIA WITH WEAK URINARY STREAM: ICD-10-CM

## 2025-02-04 DIAGNOSIS — N40.1 BENIGN PROSTATIC HYPERPLASIA WITH WEAK URINARY STREAM: ICD-10-CM

## 2025-02-04 RX ORDER — TADALAFIL 10 MG/1
10 TABLET ORAL DAILY PRN
Qty: 20 TABLET | Refills: 1 | Status: SHIPPED | OUTPATIENT
Start: 2025-02-04

## 2025-02-04 RX ORDER — TADALAFIL 5 MG/1
5 TABLET ORAL DAILY
Qty: 30 TABLET | Refills: 2 | Status: SHIPPED | OUTPATIENT
Start: 2025-02-04

## 2025-03-10 ENCOUNTER — OFFICE VISIT (OUTPATIENT)
Dept: FAMILY MEDICINE CLINIC | Facility: CLINIC | Age: 70
End: 2025-03-10
Payer: MEDICARE

## 2025-03-10 VITALS
TEMPERATURE: 96.2 F | OXYGEN SATURATION: 96 % | SYSTOLIC BLOOD PRESSURE: 122 MMHG | HEIGHT: 70 IN | BODY MASS INDEX: 33.82 KG/M2 | WEIGHT: 236.2 LBS | DIASTOLIC BLOOD PRESSURE: 68 MMHG | HEART RATE: 59 BPM

## 2025-03-10 DIAGNOSIS — R39.12 BENIGN PROSTATIC HYPERPLASIA WITH WEAK URINARY STREAM: ICD-10-CM

## 2025-03-10 DIAGNOSIS — N52.01 ERECTILE DYSFUNCTION DUE TO ARTERIAL INSUFFICIENCY: ICD-10-CM

## 2025-03-10 DIAGNOSIS — N40.1 BENIGN PROSTATIC HYPERPLASIA WITH WEAK URINARY STREAM: ICD-10-CM

## 2025-03-10 DIAGNOSIS — E11.65 TYPE 2 DIABETES MELLITUS WITH HYPERGLYCEMIA, WITHOUT LONG-TERM CURRENT USE OF INSULIN: Primary | ICD-10-CM

## 2025-03-10 DIAGNOSIS — E78.2 MIXED HYPERLIPIDEMIA: ICD-10-CM

## 2025-03-10 LAB
EXPIRATION DATE: NORMAL
HBA1C MFR BLD: 4.9 % (ref 4.5–5.7)
Lab: NORMAL

## 2025-03-10 PROCEDURE — 99214 OFFICE O/P EST MOD 30 MIN: CPT | Performed by: STUDENT IN AN ORGANIZED HEALTH CARE EDUCATION/TRAINING PROGRAM

## 2025-03-10 PROCEDURE — 3044F HG A1C LEVEL LT 7.0%: CPT | Performed by: STUDENT IN AN ORGANIZED HEALTH CARE EDUCATION/TRAINING PROGRAM

## 2025-03-10 PROCEDURE — 1126F AMNT PAIN NOTED NONE PRSNT: CPT | Performed by: STUDENT IN AN ORGANIZED HEALTH CARE EDUCATION/TRAINING PROGRAM

## 2025-03-10 PROCEDURE — 83036 HEMOGLOBIN GLYCOSYLATED A1C: CPT | Performed by: STUDENT IN AN ORGANIZED HEALTH CARE EDUCATION/TRAINING PROGRAM

## 2025-03-10 RX ORDER — METFORMIN HYDROCHLORIDE 500 MG/1
500 TABLET, EXTENDED RELEASE ORAL
Qty: 60 TABLET | Refills: 2 | Status: SHIPPED | OUTPATIENT
Start: 2025-03-10

## 2025-03-10 RX ORDER — TADALAFIL 10 MG/1
10 TABLET ORAL DAILY PRN
Qty: 20 TABLET | Refills: 1 | Status: SHIPPED | OUTPATIENT
Start: 2025-03-10

## 2025-03-10 RX ORDER — TADALAFIL 5 MG/1
5 TABLET ORAL DAILY
Qty: 30 TABLET | Refills: 2 | Status: SHIPPED | OUTPATIENT
Start: 2025-03-10

## 2025-03-10 RX ORDER — SEMAGLUTIDE 0.68 MG/ML
0.5 INJECTION, SOLUTION SUBCUTANEOUS WEEKLY
Qty: 9 ML | Refills: 1 | Status: SHIPPED | OUTPATIENT
Start: 2025-03-10

## 2025-03-10 NOTE — PROGRESS NOTES
Chief Complaint  Diabetes    HPI:   Diabetes       Arsalan Garcia is a 69 y.o. male who presents today to [facility] for Diabetes.  History of Present Illness  The patient presents for evaluation of diabetes, cholesterol management, constipation, and status post hip replacement.    He has been managing well post-right hip replacement, with no requirement for pain medication except for Tylenol to alleviate muscle soreness. He has been engaging in physical therapy twice weekly and maintains good mobility. He reports experiencing slightly more muscle pain in the left hip, which he attributes to an early cessation of physical therapy. He expresses interest in resuming jogging activities and seeks advice on the appropriate timeline for this.    He has made significant dietary modifications, resulting in a substantial weight loss. His current weight is 226 pounds, with a goal to reduce it to 200 pounds. He plans to increase his physical activity once he achieves this target.    He has discontinued statin therapy due to associated muscle pain and spasms, which manifested after a week and a half of treatment. He has previously trialed all available statins. He also reports that fish oil supplementation has not been beneficial.    He experienced constipation following his surgery, which he believes may be related to his medication regimen. He has found relief with over-the-counter MiraLAX.    MEDICATIONS  Current: metformin, Ozempic, Cialis, MiraLAX  Discontinued: statins       Previous History:   Past Medical History:   Diagnosis Date    Diabetes mellitus     Sleep apnea       Past Surgical History:   Procedure Laterality Date    TOTAL HIP ARTHROPLASTY Left           Social History     Socioeconomic History    Marital status:    Tobacco Use    Smoking status: Former     Current packs/day: 0.00     Types: Cigarettes     Quit date:      Years since quittin.2    Smokeless  "tobacco: Never   Vaping Use    Vaping status: Never Used   Substance and Sexual Activity    Alcohol use: Never    Drug use: Defer    Sexual activity: Never      Health Maintenance Due   Topic Date Due    DIABETIC FOOT EXAM  Never done    TDAP/TD VACCINES (1 - Tdap) Never done    ZOSTER VACCINE (1 of 2) Never done    HEPATITIS C SCREENING  Never done    COVID-19 Vaccine (4 - 2024-25 season) 09/01/2024        Current Medications:  Current Outpatient Medications   Medication Sig Dispense Refill    glucose blood test strip Use as instructed 100 each 12    glucose monitor monitoring kit Use 1 each As Needed (Check Glucose TID). 1 each 0    Lancets (freestyle) lancets Check Glucose  each 12    metFORMIN ER (GLUCOPHAGE-XR) 500 MG 24 hr tablet Take 1 tablet by mouth Daily With Breakfast. 60 tablet 2    ondansetron ODT (ZOFRAN-ODT) 4 MG disintegrating tablet Place 1 tablet on the tongue Every 8 (Eight) Hours As Needed for Nausea. 30 tablet 5    Semaglutide,0.25 or 0.5MG/DOS, (Ozempic, 0.25 or 0.5 MG/DOSE,) 2 MG/3ML solution pen-injector Inject 0.5 mg under the skin into the appropriate area as directed 1 (One) Time Per Week. 9 mL 1    tadalafil (Cialis) 10 MG tablet Take 1 tablet by mouth Daily As Needed for Erectile Dysfunction. 20 tablet 1    tadalafil (CIALIS) 5 MG tablet Take 1 tablet by mouth Daily. 30 tablet 2     No current facility-administered medications for this visit.       Allergies:   Allergies   Allergen Reactions    Shrimp Nausea And Vomiting       Vitals:   /68 (BP Location: Right arm, Patient Position: Sitting, Cuff Size: Adult)   Pulse 59   Temp 96.2 °F (35.7 °C) (Temporal)   Ht 177.8 cm (70\")   Wt 107 kg (236 lb 3.2 oz)   SpO2 96%   BMI 33.89 kg/m²     Estimated body mass index is 33.89 kg/m² as calculated from the following:    Height as of this encounter: 177.8 cm (70\").    Weight as of this encounter: 107 kg (236 lb 3.2 oz).    Arsalan Garcia  reports that he quit smoking about 50 years " ago. His smoking use included cigarettes. He has never used smokeless tobacco.            Physical Exam:   Physical Exam  Constitutional:       Appearance: Normal appearance.   HENT:      Mouth/Throat:      Mouth: Mucous membranes are moist.   Cardiovascular:      Rate and Rhythm: Normal rate and regular rhythm.   Pulmonary:      Effort: Pulmonary effort is normal.      Breath sounds: Normal breath sounds. No wheezing or rhonchi.   Musculoskeletal:         General: Normal range of motion.   Skin:     General: Skin is warm and dry.   Neurological:      Mental Status: He is alert.   Psychiatric:         Mood and Affect: Mood normal.          Lab Results:   Office Visit on 03/10/2025   Component Date Value Ref Range Status    Hemoglobin A1C 03/10/2025 4.9  4.5 - 5.7 % Final    Lot Number 03/10/2025 [id number]   Final    Expiration Date 03/10/2025 2026-11-04   Final   Lab on 12/09/2024   Component Date Value Ref Range Status    Total Cholesterol 12/09/2024 167  0 - 200 mg/dL Final    Comment: Cholesterol Reference Ranges  (U.S. Department of Health and Human Services ATP III  Classifications)  Desirable          <200 mg/dL  Borderline High    200-239 mg/dL  High Risk          >240 mg/dL  Triglyceride Reference Ranges  (U.S. Department of Health and Human Services ATP III  Classifications)  Normal           <150 mg/dL  Borderline High  150-199 mg/dL  High             200-499 mg/dL  Very High        >500 mg/dL  HDL Reference Ranges  (U.S. Department of Health and Human Services ATP III  Classifications)  Low     <40 mg/dl (major risk factor for CHD)  High    >60 mg/dl ('negative' risk factor for CHD)  LDL Reference Ranges  (U.S. Department of Health and Human Services ATP III  Classifications)  Optimal          <100 mg/dL  Near Optimal     100-129 mg/dL  Borderline High  130-159 mg/dL  High             160-189 mg/dL  Very High        >189 mg/dL      Triglycerides 12/09/2024 276 (H)  0 - 150 mg/dL Final    HDL Cholesterol  12/09/2024 36 (L)  40 - 60 mg/dL Final    VLDL Cholesterol Lisandro 12/09/2024 46 (H)  5 - 40 mg/dL Final    LDL Chol Calc (NIH) 12/09/2024 85  0 - 100 mg/dL Final    Glucose 12/09/2024 98  65 - 99 mg/dL Final    BUN 12/09/2024 14  8 - 23 mg/dL Final    Creatinine 12/09/2024 0.97  0.76 - 1.27 mg/dL Final    EGFR Result 12/09/2024 84.0  >60.0 mL/min/1.73 Final    Comment: GFR Normal >60  Chronic Kidney Disease <60  Kidney Failure <15      BUN/Creatinine Ratio 12/09/2024 14.4  7.0 - 25.0 Final    Sodium 12/09/2024 142  136 - 145 mmol/L Final    Potassium 12/09/2024 4.4  3.5 - 5.2 mmol/L Final    Chloride 12/09/2024 108 (H)  98 - 107 mmol/L Final    Total CO2 12/09/2024 22.8  22.0 - 29.0 mmol/L Final    Calcium 12/09/2024 8.8  8.6 - 10.5 mg/dL Final    Total Protein 12/09/2024 6.4  6.0 - 8.5 g/dL Final    Albumin 12/09/2024 4.1  3.5 - 5.2 g/dL Final    Globulin 12/09/2024 2.3  gm/dL Final    A/G Ratio 12/09/2024 1.8  g/dL Final    Total Bilirubin 12/09/2024 0.5  0.0 - 1.2 mg/dL Final    Alkaline Phosphatase 12/09/2024 110  39 - 117 U/L Final    AST (SGOT) 12/09/2024 25  1 - 40 U/L Final    ALT (SGPT) 12/09/2024 12  1 - 41 U/L Final    WBC 12/09/2024 5.79  3.40 - 10.80 10*3/mm3 Final    RBC 12/09/2024 4.84  4.14 - 5.80 10*6/mm3 Final    Hemoglobin 12/09/2024 14.8  13.0 - 17.7 g/dL Final    Hematocrit 12/09/2024 45.4  37.5 - 51.0 % Final    MCV 12/09/2024 93.8  79.0 - 97.0 fL Final    MCH 12/09/2024 30.6  26.6 - 33.0 pg Final    MCHC 12/09/2024 32.6  31.5 - 35.7 g/dL Final    RDW 12/09/2024 12.4  12.3 - 15.4 % Final    Platelets 12/09/2024 197  140 - 450 10*3/mm3 Final    Creatinine, Urine 12/09/2024 93.3  Not Estab. mg/dL Final    Microalbumin, Urine 12/09/2024 5.8  Not Estab. ug/mL Final    Microalbumin/Creatinine Ratio 12/09/2024 6  0 - 29 mg/g creat Final    Comment:                        Normal:                0 -  29                         Moderately increased: 30 - 300                         Severely increased:        >300      Interpretation 12/09/2024 Note   Final    Supplemental report is available.   Office Visit on 12/09/2024   Component Date Value Ref Range Status    Hemoglobin A1C 12/09/2024 5.9 (A)  4.5 - 5.7 % Final    Lot Number 12/09/2024 [id number]   Final    Expiration Date 12/09/2024 2026-08-21   Final   Office Visit on 11/18/2024   Component Date Value Ref Range Status    PSA 11/18/2024 0.414  0.000 - 4.000 ng/mL Final    Comment: Results may be falsely decreased if patient taking Biotin.  Testing Method: Roche [employer] Electrochemiluminescence  Immunoassay(ECLIA)  Values obtained with different assay methods or kits cannot  be used interchangeably.         Assessment and Plan  Diagnoses and all orders for this visit:    1. Benign prostatic hyperplasia with weak urinary stream  -     tadalafil (CIALIS) 5 MG tablet; Take 1 tablet by mouth Daily.  Dispense: 30 tablet; Refill: 2    2. Erectile dysfunction due to arterial insufficiency  -     tadalafil (Cialis) 10 MG tablet; Take 1 tablet by mouth Daily As Needed for Erectile Dysfunction.  Dispense: 20 tablet; Refill: 1    3. Type 2 diabetes mellitus with hyperglycemia, without long-term current use of insulin  -     Semaglutide,0.25 or 0.5MG/DOS, (Ozempic, 0.25 or 0.5 MG/DOSE,) 2 MG/3ML solution pen-injector; Inject 0.5 mg under the skin into the appropriate area as directed 1 (One) Time Per Week.  Dispense: 9 mL; Refill: 1  -     metFORMIN ER (GLUCOPHAGE-XR) 500 MG 24 hr tablet; Take 1 tablet by mouth Daily With Breakfast.  Dispense: 60 tablet; Refill: 2  -     POC Glycosylated Hemoglobin (Hb A1C)      Assessment & Plan  1. Diabetes.  His A1c levels are within the non-diabetic range at 4.9, indicating effective management of his diabetes. Given his significant weight loss, it is plausible that his diabetes is currently in remission and may remain so. He has been advised to continue his current medication regimen, including metformin and Ozempic, for an additional  3 months. If he maintains his A1c levels during this period, discontinuation of these medications will be considered.    2. Cholesterol management.  His cholesterol levels are commendable. The primary reason for his cholesterol medication is his diabetic condition, which predisposes him to a higher risk of heart attacks and strokes. However, given his current non-diabetic status, the necessity of this medication is questionable. He has been informed that he can discontinue his cholesterol medication if he so chooses.    3. Constipation.  His constipation is likely a side effect of Ozempic rather than metformin. He has been advised to dissolve a capful of MiraLAX in his morning coffee to aid in bowel regulation.    4. Status post right hip replacement.  He is doing well post-surgery with no pain and has not required any pain medication except for Tylenol for sore muscles. He is attending physical therapy twice a week and walking well. He has been advised that he can likely resume jogging 6 weeks post-surgery, but he should confirm this with his surgeon.    5. Medication management.  A prescription for Cialis has been sent to his pharmacy.    PROCEDURE  The patient underwent a right hip replacement procedure.               New Medications:   New Medications Ordered This Visit   Medications    tadalafil (CIALIS) 5 MG tablet     Sig: Take 1 tablet by mouth Daily.     Dispense:  30 tablet     Refill:  2    tadalafil (Cialis) 10 MG tablet     Sig: Take 1 tablet by mouth Daily As Needed for Erectile Dysfunction.     Dispense:  20 tablet     Refill:  1    Semaglutide,0.25 or 0.5MG/DOS, (Ozempic, 0.25 or 0.5 MG/DOSE,) 2 MG/3ML solution pen-injector     Sig: Inject 0.5 mg under the skin into the appropriate area as directed 1 (One) Time Per Week.     Dispense:  9 mL     Refill:  1    metFORMIN ER (GLUCOPHAGE-XR) 500 MG 24 hr tablet     Sig: Take 1 tablet by mouth Daily With Breakfast.     Dispense:  60 tablet     Refill:  2        Discontinued Medications:   Medications Discontinued During This Encounter   Medication Reason    atorvastatin (LIPITOR) 10 MG tablet *Therapy completed    Semaglutide,0.25 or 0.5MG/DOS, (Ozempic, 0.25 or 0.5 MG/DOSE,) 2 MG/3ML solution pen-injector Reorder    metFORMIN ER (GLUCOPHAGE-XR) 500 MG 24 hr tablet Reorder    tadalafil (CIALIS) 5 MG tablet Reorder    tadalafil (Cialis) 10 MG tablet Reorder                 Follow Up:   Return in about 3 months (around 6/10/2025).    Patient was given instructions and counseling regarding his condition or for health maintenance advice. Please see specific information pulled into the AVS if appropriate.     Patient or patient representative verbalized consent for the use of Ambient Listening during the visit with  Hari Diaz DO for chart documentation. 3/10/2025  14:07 EDT       This document has been electronically signed by Hari Diaz DO   March 10, 2025 13:19 EDT      Dictated Utilizing Dragon Dictation: Part of this note may be an electronic transcription/translation of spoken language to printed text using the Dragon Dictation System.

## 2025-03-31 ENCOUNTER — TELEPHONE (OUTPATIENT)
Dept: FAMILY MEDICINE CLINIC | Facility: CLINIC | Age: 70
End: 2025-03-31
Payer: MEDICARE

## 2025-03-31 NOTE — TELEPHONE ENCOUNTER
Pharmacy Name: [facility] - [address] - [phone]  - [phone]      Pharmacy representative phone number: [phone]     What medication are you calling in regards to: METFORMIN    What question does the pharmacy have: PHARMACY ADVISES THAT THERE HAS BEEN A CHANGE IN DIRECTIONS TO TAKE TO TAKE ONCE DAILY INSTEAD OF TWICE DAILY.    Who is the provider that prescribed the medication: RICHARD MOSLEY DO

## 2025-05-09 ENCOUNTER — EXTERNAL PBMM DATA (OUTPATIENT)
Dept: PHARMACY | Facility: OTHER | Age: 70
End: 2025-05-09
Payer: MEDICARE

## 2025-06-10 ENCOUNTER — OFFICE VISIT (OUTPATIENT)
Dept: FAMILY MEDICINE CLINIC | Facility: CLINIC | Age: 70
End: 2025-06-10
Payer: MEDICARE

## 2025-06-10 ENCOUNTER — LAB (OUTPATIENT)
Dept: FAMILY MEDICINE CLINIC | Facility: CLINIC | Age: 70
End: 2025-06-10
Payer: MEDICARE

## 2025-06-10 VITALS
WEIGHT: 223.2 LBS | HEART RATE: 60 BPM | TEMPERATURE: 98.2 F | OXYGEN SATURATION: 95 % | BODY MASS INDEX: 31.95 KG/M2 | DIASTOLIC BLOOD PRESSURE: 64 MMHG | HEIGHT: 70 IN | SYSTOLIC BLOOD PRESSURE: 106 MMHG

## 2025-06-10 DIAGNOSIS — R11.0 NAUSEA: ICD-10-CM

## 2025-06-10 DIAGNOSIS — E11.65 TYPE 2 DIABETES MELLITUS WITH HYPERGLYCEMIA, WITHOUT LONG-TERM CURRENT USE OF INSULIN: Primary | ICD-10-CM

## 2025-06-10 DIAGNOSIS — E11.65 TYPE 2 DIABETES MELLITUS WITH HYPERGLYCEMIA, WITHOUT LONG-TERM CURRENT USE OF INSULIN: ICD-10-CM

## 2025-06-10 DIAGNOSIS — N52.01 ERECTILE DYSFUNCTION DUE TO ARTERIAL INSUFFICIENCY: Primary | ICD-10-CM

## 2025-06-10 LAB
EXPIRATION DATE: NORMAL
HBA1C MFR BLD: 5.2 % (ref 4.5–5.7)
Lab: NORMAL

## 2025-06-10 RX ORDER — TADALAFIL 20 MG/1
20 TABLET ORAL DAILY PRN
Qty: 90 TABLET | Refills: 1 | Status: SHIPPED | OUTPATIENT
Start: 2025-06-10

## 2025-06-10 NOTE — PROGRESS NOTES
Chief Complaint  Diabetes    HPI:   Diabetes     Arsalan Garcia is a 70 y.o. male who presents today to [facility] for Diabetes.  History of Present Illness  The patient presents for erectile dysfunction and weight management.    He is currently on a daily regimen of Cialis 5 mg, which he supplements with an additional 15 mg as needed for erectile function. He has not yet experimented with a daily dose of 20 mg. He procures his medication from [employer] at a cost of approximately 30 dollars. He expresses concern about the potential impact of Cialis on his blood pressure, which is currently low. He plans to contact the clinic if he experiences any adverse effects related to his blood pressure and will consider reducing the dosage of Cialis.    His A1c level was previously recorded at 4.9 but has since increased to 5.2 following dietary modifications. He has achieved a weight loss of 13 pounds and aims to reduce his weight to 200 pounds. He was initiated on Ozempic by this provider when his A1c level was 10.9, which was subsequently reduced within a 2-month period. He recently refilled his prescriptions for metformin and Ozempic. He is considering reducing his metformin intake to one tablet per day. He monitors his blood glucose levels every morning, which typically range from the mid-80s to low 90s, occasionally reaching 100. He has made some modifications to his diet and occasionally dines out.    He reports that his hips are improving in strength and overall condition. He feels significantly better post-surgery compared to his pre-surgery state.       Previous History:   Past Medical History:   Diagnosis Date    Diabetes mellitus     Sleep apnea       Past Surgical History:   Procedure Laterality Date    TOTAL HIP ARTHROPLASTY Left     2024      Social History     Socioeconomic History    Marital status:    Tobacco Use    Smoking status: Former     Current packs/day: 0.00     " Types: Cigarettes     Quit date:      Years since quittin.4    Smokeless tobacco: Never   Vaping Use    Vaping status: Never Used   Substance and Sexual Activity    Alcohol use: Never    Drug use: Defer    Sexual activity: Never      Health Maintenance Due   Topic Date Due    DIABETIC FOOT EXAM  Never done    TDAP/TD VACCINES (1 - Tdap) Never done    ZOSTER VACCINE (1 of 2) Never done    HEPATITIS C SCREENING  Never done    COVID-19 Vaccine ( season) 2024        Current Medications:  Current Outpatient Medications   Medication Sig Dispense Refill    glucose blood test strip Use as instructed 100 each 12    glucose monitor monitoring kit Use 1 each As Needed (Check Glucose TID). 1 each 0    Lancets (freestyle) lancets Check Glucose  each 12    metFORMIN ER (GLUCOPHAGE-XR) 500 MG 24 hr tablet Take 1 tablet by mouth Daily With Breakfast. 60 tablet 2    ondansetron ODT (ZOFRAN-ODT) 4 MG disintegrating tablet Place 1 tablet on the tongue Every 8 (Eight) Hours As Needed for Nausea. 30 tablet 5    Semaglutide,0.25 or 0.5MG/DOS, (Ozempic, 0.25 or 0.5 MG/DOSE,) 2 MG/3ML solution pen-injector Inject 0.5 mg under the skin into the appropriate area as directed 1 (One) Time Per Week. 9 mL 1    tadalafil (Cialis) 20 MG tablet Take 1 tablet by mouth Daily As Needed for Erectile Dysfunction. 90 tablet 1     No current facility-administered medications for this visit.       Allergies:   Allergies   Allergen Reactions    Shrimp Nausea And Vomiting    Statins Other (See Comments)     myalgias       Vitals:   /64 (BP Location: Right arm, Patient Position: Sitting, Cuff Size: Adult)   Pulse 60   Temp 98.2 °F (36.8 °C) (Temporal)   Ht 177.8 cm (70\")   Wt 101 kg (223 lb 3.2 oz)   SpO2 95%   BMI 32.03 kg/m²     Estimated body mass index is 32.03 kg/m² as calculated from the following:    Height as of this encounter: 177.8 cm (70\").    Weight as of this encounter: 101 kg (223 lb 3.2 oz).    Arsalan " Jose  reports that he quit smoking about 50 years ago. His smoking use included cigarettes. He has never used smokeless tobacco.          Physical Exam:   Physical Exam  Constitutional:       Appearance: Normal appearance.   HENT:      Mouth/Throat:      Mouth: Mucous membranes are moist.   Cardiovascular:      Rate and Rhythm: Normal rate and regular rhythm.   Pulmonary:      Effort: Pulmonary effort is normal.      Breath sounds: Normal breath sounds. No wheezing or rhonchi.   Musculoskeletal:         General: Normal range of motion.   Skin:     General: Skin is warm and dry.   Neurological:      Mental Status: He is alert.   Psychiatric:         Mood and Affect: Mood normal.          Lab Results:   Office Visit on 06/10/2025   Component Date Value Ref Range Status    Hemoglobin A1C 06/10/2025 5.2  4.5 - 5.7 % Final    Lot Number 06/10/2025 [id number]   Final    Expiration Date 06/10/2025 2027-02-17   Final   Office Visit on 03/10/2025   Component Date Value Ref Range Status    Hemoglobin A1C 03/10/2025 4.9  4.5 - 5.7 % Final    Lot Number 03/10/2025 [id number]   Final    Expiration Date 03/10/2025 2026-11-04   Final       Assessment and Plan  Diagnoses and all orders for this visit:    1. Erectile dysfunction due to arterial insufficiency (Primary)  -     tadalafil (Cialis) 20 MG tablet; Take 1 tablet by mouth Daily As Needed for Erectile Dysfunction.  Dispense: 90 tablet; Refill: 1    2. Type 2 diabetes mellitus with hyperglycemia, without long-term current use of insulin  -     POC Glycosylated Hemoglobin (Hb A1C)  -     Cancel: CBC No Differential; Future  -     Cancel: Comprehensive metabolic panel; Future      Assessment & Plan  1. Erectile Dysfunction.  - Cialis 20 mg daily is recommended for erectile dysfunction and urination issues.  - This dosage should not affect blood pressure.  - If any issues with blood pressure arise, contact the office and consider reducing the dosage.  - Medication sent to  pharmacy.    2. Type II diabetes  - Diabetes is in remission with ozempic. Discussed possibility of stopping ozempic, patient wants to think about this.   - Continue metformin as long as it is well-tolerated, as it can help prevent diabetes without significant side effects.    Follow-up  - Monitor blood glucose levels weekly, and increase frequency if levels rise.  - Reassess treatment plan if A1c level increases over the next 3 months.  - Follow-up appointment scheduled in 3 months.               New Medications:   New Medications Ordered This Visit   Medications    tadalafil (Cialis) 20 MG tablet     Sig: Take 1 tablet by mouth Daily As Needed for Erectile Dysfunction.     Dispense:  90 tablet     Refill:  1       Discontinued Medications:   Medications Discontinued During This Encounter   Medication Reason    tadalafil (Cialis) 10 MG tablet *Therapy completed    tadalafil (CIALIS) 5 MG tablet *Therapy completed                 Follow Up:   Return in about 3 months (around 9/10/2025), or needs labs.    Patient was given instructions and counseling regarding his condition or for health maintenance advice. Please see specific information pulled into the AVS if appropriate.     Patient or patient representative verbalized consent for the use of Ambient Listening during the visit with  Hari Diaz DO for chart documentation. 6/10/2025  15:20 EDT       This document has been electronically signed by Hari Diaz DO   Katiana 10, 2025 15:20 EDT      Dictated Utilizing Dragon Dictation: Part of this note may be an electronic transcription/translation of spoken language to printed text using the Dragon Dictation System.

## 2025-06-11 ENCOUNTER — RESULTS FOLLOW-UP (OUTPATIENT)
Dept: FAMILY MEDICINE CLINIC | Facility: CLINIC | Age: 70
End: 2025-06-11
Payer: MEDICARE

## 2025-06-11 LAB
ALBUMIN SERPL-MCNC: 4 G/DL (ref 3.5–5.2)
ALBUMIN/GLOB SERPL: 1.7 G/DL
ALP SERPL-CCNC: 100 U/L (ref 39–117)
ALT SERPL-CCNC: 9 U/L (ref 1–41)
AST SERPL-CCNC: 22 U/L (ref 1–40)
BILIRUB SERPL-MCNC: 0.6 MG/DL (ref 0–1.2)
BUN SERPL-MCNC: 13 MG/DL (ref 8–23)
BUN/CREAT SERPL: 15.5 (ref 7–25)
CALCIUM SERPL-MCNC: 9.2 MG/DL (ref 8.6–10.5)
CHLORIDE SERPL-SCNC: 104 MMOL/L (ref 98–107)
CO2 SERPL-SCNC: 22.2 MMOL/L (ref 22–29)
CREAT SERPL-MCNC: 0.84 MG/DL (ref 0.76–1.27)
EGFRCR SERPLBLD CKD-EPI 2021: 93.2 ML/MIN/1.73
ERYTHROCYTE [DISTWIDTH] IN BLOOD BY AUTOMATED COUNT: 13.4 % (ref 12.3–15.4)
GLOBULIN SER CALC-MCNC: 2.4 GM/DL
GLUCOSE SERPL-MCNC: 96 MG/DL (ref 65–99)
HCT VFR BLD AUTO: 47.7 % (ref 37.5–51)
HGB BLD-MCNC: 15.3 G/DL (ref 13–17.7)
MCH RBC QN AUTO: 28.9 PG (ref 26.6–33)
MCHC RBC AUTO-ENTMCNC: 32.1 G/DL (ref 31.5–35.7)
MCV RBC AUTO: 90.2 FL (ref 79–97)
PLATELET # BLD AUTO: 182 10*3/MM3 (ref 140–450)
POTASSIUM SERPL-SCNC: 4.2 MMOL/L (ref 3.5–5.2)
PROT SERPL-MCNC: 6.4 G/DL (ref 6–8.5)
RBC # BLD AUTO: 5.29 10*6/MM3 (ref 4.14–5.8)
SODIUM SERPL-SCNC: 137 MMOL/L (ref 136–145)
WBC # BLD AUTO: 5.73 10*3/MM3 (ref 3.4–10.8)

## 2025-07-23 ENCOUNTER — EXTERNAL PBMM DATA (OUTPATIENT)
Dept: PHARMACY | Facility: OTHER | Age: 70
End: 2025-07-23
Payer: MEDICARE